# Patient Record
Sex: MALE | Race: WHITE | URBAN - METROPOLITAN AREA
[De-identification: names, ages, dates, MRNs, and addresses within clinical notes are randomized per-mention and may not be internally consistent; named-entity substitution may affect disease eponyms.]

---

## 2019-04-12 ENCOUNTER — OFFICE VISIT (OUTPATIENT)
Dept: FAMILY MEDICINE CLINIC | Age: 44
End: 2019-04-12

## 2019-04-12 VITALS
BODY MASS INDEX: 27.85 KG/M2 | DIASTOLIC BLOOD PRESSURE: 85 MMHG | HEART RATE: 71 BPM | OXYGEN SATURATION: 98 % | HEIGHT: 75 IN | WEIGHT: 224 LBS | TEMPERATURE: 98 F | RESPIRATION RATE: 16 BRPM | SYSTOLIC BLOOD PRESSURE: 123 MMHG

## 2019-04-12 DIAGNOSIS — G47.30 SLEEP APNEA, UNSPECIFIED TYPE: ICD-10-CM

## 2019-04-12 DIAGNOSIS — Z00.00 ANNUAL PHYSICAL EXAM: Primary | ICD-10-CM

## 2019-04-12 NOTE — PROGRESS NOTES
Jennifer Bull is a 44 y.o. male Presenting for his annual checkup and health maintenance review and follow-up Overall, doing well. Patient states that he often does not feel well rested. He does snore at night and has decreased energy throughout the day. . Colonoscopy not indicated. No family history. UTD on dental and vision. He did not get influenza. Patient is getting daily exercise by walking the dog. Trying to eat a well balanced diet. He is working full time and going to school full time Health Maintenance reviewed Vaccinations reviewed There is no immunization history on file for this patient. Past Medical History:  
Diagnosis Date  Asthma   
 History of basal cell cancer   
 
 has a past surgical history that includes hx orthopaedic and hx other surgical. 
 
Shellfish derived Current Outpatient Medications Medication Sig  MULTIVITAMIN PO Take  by mouth. No current facility-administered medications for this visit. SOCIAL HX:  reports that he has never smoked. He has never used smokeless tobacco. He reports that he drinks alcohol. He reports that he does not use drugs. FAMILY HX: family history includes Coronary Artery Disease in his paternal grandfather. Review of Systems - History obtained from the patient ROS: (positive in bold) General: wt loss, fever, fatigue or appetite change Skin: rashes or suspicious skin lesions HEENT: changes in vision, dysphagia, congestion, sore throat Cardiac: chest pain, palpitations, [place], edema Pul: SOB, dyspnea GI: abdominal pain, N&V, diarrhea, constipation, hematemsis, melena,  
: hematuria, dysuria, freq, urgency, incontinence MS: joint pain, swelling, stiffness, myalgia, back pain Neuro: weakness, parasthesias, headache Psych: anxiety, depression Physical exam 
Blood pressure 123/85, pulse 71, temperature 98 °F (36.7 °C), temperature source Oral, resp.  rate 16, height 6' 3\" (1.905 m), weight 224 lb (101.6 kg), SpO2 98 %. Wt Readings from Last 3 Encounters:  
04/12/19 224 lb (101.6 kg) Gen:  Well developed, well nourished male in no acute distress HEENT: normocephalic/atraumatic; PERRL; TM intact, translucent, and neutral BL;  oropharynx shows no erythema or exudates, Mallampati 4 Skin:  No rashes or suspicious skin lesions noted Neck:   Supple, no lympadenopathy, no thyromegaly Card:  RRR, no m/r/g Chest:  CTAB, no w/r/r Abd:  BS+, Soft, nontender/nondistended Extr:  2+ pulses BL, no LE edema MS:   full ROM, 5/5 strength BL, sensation intact Neuro: AAO X 3, CN II-XII grossly intact Psych:  Nl mood and affect Assessment/Plan ICD-10-CM ICD-9-CM 1. Annual physical exam Z00.00 V70.0 LIPID PANEL  
   METABOLIC PANEL, COMPREHENSIVE 2. Sleep apnea, unspecified type G47.30 780.57 SLEEP MEDICINE REFERRAL Annual 
44 y.o. male who presents today for annual exam. UTD on screening. Declines influenza vaccine. Will check routine labs. Encouraged daily exercise and trying to eat a well balanced diet. JASKARAN Patient reports fatigue, not feeling well rested, and snoring. Suspect sleep apnea. Has seen urology for possible low T. STOP BANG score is 3. Patient interested in sleep study. Will refer to sleep medicine. I have discussed the diagnosis with the patient and the intended plan as seen in the above orders. The patient has received an after-visit summary and questions were answered concerning future plans. I have discussed medication side effects and warnings with the patient as well. The patient agrees and understands above plan. Follow-up and Dispositions · Return in about 1 year (around 4/12/2020) for Annual. 
  
 
 
Patient discussed with supervising attending.   
 
Shelton Ramírez DO

## 2019-04-12 NOTE — PATIENT INSTRUCTIONS
Sleep Apnea: Care Instructions Your Care Instructions Sleep apnea means that you frequently stop breathing for 10 seconds or longer during sleep. It can be mild to severe, based on the number of times an hour that you stop breathing or have slowed breathing. Blocked or narrowed airways in your nose, mouth, or throat can cause sleep apnea. Your airway can become blocked when your throat muscles and tongue relax during sleep. You can treat sleep apnea at home by making lifestyle changes. You also can use a CPAP breathing machine that keeps tissues in the throat from blocking your airway. Or your doctor may suggest that you use a breathing device while you sleep. It helps keep your airway open. This could be a device that you put in your mouth. In some cases, surgery may be needed to remove enlarged tissues in the throat. Follow-up care is a key part of your treatment and safety. Be sure to make and go to all appointments, and call your doctor if you are having problems. It's also a good idea to know your test results and keep a list of the medicines you take. How can you care for yourself at home? · Lose weight, if needed. It may reduce the number of times you stop breathing or have slowed breathing. · Sleep on your side. It may stop mild apnea. If you tend to roll onto your back, sew a pocket in the back of your pajama top. Put a tennis ball into the pocket, and stitch the pocket shut. This will help keep you from sleeping on your back. · Avoid alcohol and medicines such as sleeping pills and sedatives before bed. · Do not smoke. Smoking can make sleep apnea worse. If you need help quitting, talk to your doctor about stop-smoking programs and medicines. These can increase your chances of quitting for good. · Prop up the head of your bed 4 to 6 inches by putting bricks under the legs of the bed. · Treat breathing problems, such as a stuffy nose, caused by a cold or allergies. · Try a continuous positive airway pressure (CPAP) breathing machine if your doctor recommends it. The machine keeps your airway open when you sleep. · If CPAP does not work for you, ask your doctor if you can try other breathing machines. A bilevel positive airway pressure machine uses one type of air pressure for breathing in and another type for breathing out. Another device raises or lowers air pressure as needed while you breathe. · Talk to your doctor if: 
? Your nose feels dry or bleeds when you use one of these machines. You may need to increase moisture in the air. A humidifier may help. ? Your nose is runny or stuffy from using a breathing machine. Decongestants or a corticosteroid nasal spray may help. ? You are sleepy during the day and it gets in the way of the normal things you do. Do not drive when you are drowsy. When should you call for help? Watch closely for changes in your health, and be sure to contact your doctor if: 
  · You still have sleep apnea even though you have made lifestyle changes.  
  · You are thinking of trying a device such as CPAP.  
  · You are having problems using a CPAP or similar machine. Where can you learn more? Go to http://олег-iman.info/. Enter X427 in the search box to learn more about \"Sleep Apnea: Care Instructions. \" Current as of: September 5, 2018 Content Version: 11.9 © 9164-0778 [employer]. Care instructions adapted under license by [employer] (which disclaims liability or warranty for this information). If you have questions about a medical condition or this instruction, always ask your healthcare professional. [place] [address] any warranty or liability for your use of this information. Well Visit, Ages 25 to 48: Care Instructions Your Care Instructions Physical exams can help you stay healthy.  Your doctor has checked your overall health and may have suggested ways to take good care of yourself. He or she also may have recommended tests. At home, you can help prevent illness with healthy eating, regular exercise, and other steps. Follow-up care is a key part of your treatment and safety. Be sure to make and go to all appointments, and call your doctor if you are having problems. It's also a good idea to know your test results and keep a list of the medicines you take. How can you care for yourself at home? · Reach and stay at a healthy weight. This will lower your risk for many problems, such as obesity, diabetes, heart disease, and high blood pressure. · Get at least 30 minutes of physical activity on most days of the week. Walking is a good choice. You also may want to do other activities, such as running, swimming, cycling, or playing tennis or team sports. Discuss any changes in your exercise program with your doctor. · Do not smoke or allow others to smoke around you. If you need help quitting, talk to your doctor about stop-smoking programs and medicines. These can increase your chances of quitting for good. · Talk to your doctor about whether you have any risk factors for sexually transmitted infections (STIs). Having one sex partner (who does not have STIs and does not have sex with anyone else) is a good way to avoid these infections. · Use birth control if you do not want to have children at this time. Talk with your doctor about the choices available and what might be best for you. · Protect your skin from too much sun. When you're outdoors from 10 a.m. to 4 p.m., stay in the shade or cover up with clothing and a hat with a wide brim. Wear sunglasses that block UV rays. Even when it's cloudy, put broad-spectrum sunscreen (SPF 30 or higher) on any exposed skin. · See a dentist one or two times a year for checkups and to have your teeth cleaned. · Wear a seat belt in the car. · Drink alcohol in moderation, if at all. That means no more than 2 drinks a day for men and 1 drink a day for women. Follow your doctor's advice about when to have certain tests. These tests can spot problems early. For everyone · Cholesterol. Have the fat (cholesterol) in your blood tested after age 21. Your doctor will tell you how often to have this done based on your age, family history, or other things that can increase your risk for heart disease. · Blood pressure. Have your blood pressure checked during a routine doctor visit. Your doctor will tell you how often to check your blood pressure based on your age, your blood pressure results, and other factors. · Vision. Talk with your doctor about how often to have a glaucoma test. 
· Diabetes. Ask your doctor whether you should have tests for diabetes. · Colon cancer. Have a test for colon cancer at age 48. You may have one of several tests. If you are younger than 48, you may need a test earlier if you have any risk factors. Risk factors include whether you already had a precancerous polyp removed from your colon or whether your parent, brother, sister, or child has had colon cancer. For women · Breast exam and mammogram. Talk to your doctor about when you should have a clinical breast exam and a mammogram. Medical experts differ on whether and how often women under 50 should have these tests. Your doctor can help you decide what is right for you. · Pap test and pelvic exam. Begin Pap tests at age 24. A Pap test is the best way to find cervical cancer. The test often is part of a pelvic exam. Ask how often to have this test. 
· Tests for sexually transmitted infections (STIs). Ask whether you should have tests for STIs. You may be at risk if you have sex with more than one person, especially if your partners do not wear condoms. For men · Tests for sexually transmitted infections (STIs).  Ask whether you should have tests for STIs. You may be at risk if you have sex with more than one person, especially if you do not wear a condom. · Testicular cancer exam. Ask your doctor whether you should check your testicles regularly. · Prostate exam. Talk to your doctor about whether you should have a blood test (called a PSA test) for prostate cancer. Experts differ on whether and when men should have this test. Some experts suggest it if you are older than 39 and are -American or have a father or brother who got prostate cancer when he was younger than 72. When should you call for help? Watch closely for changes in your health, and be sure to contact your doctor if you have any problems or symptoms that concern you. Where can you learn more? Go to http://олег-iman.info/. Enter P072 in the search box to learn more about \"Well Visit, Ages 25 to 48: Care Instructions. \" Current as of: March 28, 2018 Content Version: 11.9 © 4130-1215 "[employer] Incorporated. Care instructions adapted under license by [employer] (which disclaims liability or warranty for this information). If you have questions about a medical condition or this instruction, always ask your healthcare professional. [address] any warranty or liability for your use of this information.

## 2019-04-12 NOTE — PROGRESS NOTES
1. Have you been to the ER, urgent care clinic since your last visit? Hospitalized since your last visit? No 
 
2. Have you seen or consulted any other health care providers outside of the [address] [place] [address] since your last visit? Include any pap smears or colon screening.  No

## 2019-04-13 LAB
ALBUMIN SERPL-MCNC: 4.5 G/DL (ref 3.5–5.5)
ALBUMIN/GLOB SERPL: 2 {RATIO} (ref 1.2–2.2)
ALP SERPL-CCNC: 89 IU/L (ref 39–117)
ALT SERPL-CCNC: 44 IU/L (ref 0–44)
AST SERPL-CCNC: 23 IU/L (ref 0–40)
BILIRUB SERPL-MCNC: 0.4 MG/DL (ref 0–1.2)
BUN SERPL-MCNC: 14 MG/DL (ref 6–24)
BUN/CREAT SERPL: 14 (ref 9–20)
CALCIUM SERPL-MCNC: 9.3 MG/DL (ref 8.7–10.2)
CHLORIDE SERPL-SCNC: 103 MMOL/L (ref 96–106)
CHOLEST SERPL-MCNC: 145 MG/DL (ref 100–199)
CO2 SERPL-SCNC: 22 MMOL/L (ref 20–29)
CREAT SERPL-MCNC: 1 MG/DL (ref 0.76–1.27)
GLOBULIN SER CALC-MCNC: 2.3 G/DL (ref 1.5–4.5)
GLUCOSE SERPL-MCNC: 91 MG/DL (ref 65–99)
HDLC SERPL-MCNC: 38 MG/DL
LDLC SERPL CALC-MCNC: 72 MG/DL (ref 0–99)
POTASSIUM SERPL-SCNC: 4.5 MMOL/L (ref 3.5–5.2)
PROT SERPL-MCNC: 6.8 G/DL (ref 6–8.5)
SODIUM SERPL-SCNC: 145 MMOL/L (ref 134–144)
TRIGL SERPL-MCNC: 176 MG/DL (ref 0–149)
VLDLC SERPL CALC-MCNC: 35 MG/DL (ref 5–40)